# Patient Record
Sex: MALE | Race: WHITE | URBAN - METROPOLITAN AREA
[De-identification: names, ages, dates, MRNs, and addresses within clinical notes are randomized per-mention and may not be internally consistent; named-entity substitution may affect disease eponyms.]

---

## 2017-04-26 ENCOUNTER — HISTORICAL (OUTPATIENT)
Dept: ADMINISTRATIVE | Facility: HOSPITAL | Age: 82
End: 2017-04-26

## 2017-06-07 ENCOUNTER — HISTORICAL (OUTPATIENT)
Dept: ADMINISTRATIVE | Facility: HOSPITAL | Age: 82
End: 2017-06-07

## 2017-08-09 ENCOUNTER — HISTORICAL (OUTPATIENT)
Dept: ADMINISTRATIVE | Facility: HOSPITAL | Age: 82
End: 2017-08-09

## 2022-04-10 ENCOUNTER — HISTORICAL (OUTPATIENT)
Dept: ADMINISTRATIVE | Facility: HOSPITAL | Age: 87
End: 2022-04-10

## 2022-04-26 VITALS
HEIGHT: 69 IN | DIASTOLIC BLOOD PRESSURE: 77 MMHG | BODY MASS INDEX: 29.55 KG/M2 | SYSTOLIC BLOOD PRESSURE: 134 MMHG | WEIGHT: 199.5 LBS

## 2022-05-03 NOTE — HISTORICAL [facility]ER
This is a historical note converted from [employer]. Formatting and pictures may have been removed.  Please reference [employer]ashia for original formatting and attached multimedia. History of Present Illness  3/11/2017:?Right hip hemiarthroplasty  ?   He returns today. He is at home. ?He is ambulate with a walker. ?He complains of?bilateral knee pain.? The pain in his knees are localized globally. ?It is worse with weightbearing. ?It somewhat better with rest.? He is?using some over-the-counter medicines for it.? His hip is doing well. ?He has no pain  Review of Systems  Parkinsons disease, + smoker  Physical Exam  Gen: WN, WD, NAD  Card/Res: NL breathing, +distal pulses  Abdomen: ND  Standing exam  stance: normal alignment, no significant leg-length discrepancy  gait:?Antalgic limp  ?   Knee examination  - General comments: unremarkable appearance  ?   - Tenderness: Global  ?   Knee??????????RIGHT??????????LEFT  Skin: ??????????Intact ??????????Intact  ROM:??????????5-110??????????  Effusion:??????+??????????????? +  MJL TTP:??????+?????????????? +  LJL TTP:????????+?????????????? +  ?   Valgus stress: Neg ???????????????Neg  Varus stress: Neg ???????????????Neg  Posterior drawer: Neg ??????????Neg  ?   N-V ????????????????????intact??????????intact  Hip:?????????????????????????nml?????????? nml  ?   Lower extremity edema:Negative  ?  Assessment/Plan  1.?Bilateral primary osteoarthritis of knee  We will give injections to both knees today.  ?  Risks of cortisone were discussed with the patient including hypopigmentation subcutaneous fat atrophy, and post injection pain flare. The patient understood these risks and requested to proceed. The right knee was prepped with betadine. The 1cc of steroid and 5cc of lidocaine injection was administered under sterile techinique. The injection was administered in clinic by me, Zeyad Mariano, and the patient tolerated the procedure well.  ?  Risks of cortisone were discussed with the  patient including hypopigmentation subcutaneous fat atrophy, and post injection pain flare. The patient understood these risks and requested to proceed. The left knee was prepped with betadine. The 1cc of steroid and 5cc of lidocaine injection was administered under sterile techinique. The injection was administered in clinic by me, Zeyad Mariano, and the patient tolerated the procedure well.  ?  Ordered:  betamethasone, 12 mg, Intra-Articular, Once, first dose 06/07/17 10:00:00 CDT, stop date 06/07/17 10:00:00 CDT, 24  Lidocaine inj., 2 mL, Intra-Articular, Once, first dose 06/07/17 9:32:00 CDT, stop date 06/07/17 9:32:00 CDT  asp/inj jnt/bursa, major [id number] PC, 06/07/17 9:32:00 CDT, [facility], Routine, 06/07/17 9:32:00 CDT  Clinic Follow up, *Est. 09/07/17 3:00:00 CDT, Order for future visit, Bilateral primary osteoarthritis of knee  Closed fracture of right hip with routine healing, [facility]  Office/Outpatient Visit Level 2 Established [id number] PC, Bilateral primary osteoarthritis of knee, [facility], 06/07/17 9:32:00 CDT  ?  Closed fracture of right hip with routine healing  Doing well status post above. ?Continue ambulating  Ordered:  Clinic Follow up, *Est. 09/07/17 3:00:00 CDT, Order for future visit, Bilateral primary osteoarthritis of knee  Closed fracture of right hip with routine healing, [facility]  Post-Op follow-up visit [id number] PC, Closed fracture of right hip with routine healing, [facility], 06/07/17 9:32:00 CDT  XR Hip Right 2 Views, Routine, 06/07/17 9:16:00 CDT, Fracture, None, Patient Bed, Patient Has IV?, Patient on Oxygen?, Rad Type, Closed fracture of right hip with routine healing, Not Scheduled, 06/07/17 9:16:00 CDT  ?   Problem List/Past Medical History  Arthritis  Bilateral primary osteoarthritis of knee  GERD (gastroesophageal reflux disease)  Parkinson disease  Tobacco user  UTI (urinary tract infection)  Historical  No historical  problems  Procedure/Surgical History  Forest Arthroplasty Hip (Right) (03/11/2017), Replacement of Right Hip Joint, Femoral Surface with Synthetic Substitute, Cemented, Open Approach (03/11/2017), Partial hip replacement (07/31/2012), HERNIA REPAIR.  Medications  aspirin 81 mg oral Delayed Release (EC) tablet, 81 mg, 1 tab(s), Oral, Daily  Bacmin oral tablet, 1 tab(s), Oral, Daily  Celestone, 12 mg, Intra-Articular, Once  lidocaine 2% injectable solution, 2 mL, Intra-Articular, Once  metoprolol tartrate 50 mg oral tab, 50 mg, 1 tab(s), Oral, BID  oxycodone 5 mg oral tablet, 5 mg, 1 tab(s), Oral, q4hr, PRN,? ?Not taking  Protonix 40 mg ORAL enteric coated tablet, 40 mg, 1 tab(s), Oral, Daily  Vitamin C 500 mg oral tablet, extended release  Allergies  codeine  Social History  Alcohol  Never  Substance Abuse  Never  Tobacco  Current every day smoker  Diagnostic Results  Hip radiograph 6/7/2017: 3 views of the hip show appropriate placement of prosthesis.     [1]?Office Visit Note; Montserrat CAMPO MD, Zeyad FERRIS 04/26/2017 09:30 CDT